# Patient Record
Sex: MALE | Race: WHITE
[De-identification: names, ages, dates, MRNs, and addresses within clinical notes are randomized per-mention and may not be internally consistent; named-entity substitution may affect disease eponyms.]

---

## 2019-11-22 ENCOUNTER — HOSPITAL ENCOUNTER (EMERGENCY)
Dept: HOSPITAL 95 - ER | Age: 49
Discharge: HOME | End: 2019-11-22
Payer: COMMERCIAL

## 2019-11-22 VITALS — WEIGHT: 208.01 LBS | HEIGHT: 70 IN | BODY MASS INDEX: 29.78 KG/M2

## 2019-11-22 DIAGNOSIS — E11.51: Primary | ICD-10-CM

## 2019-11-22 DIAGNOSIS — F43.10: ICD-10-CM

## 2019-11-22 DIAGNOSIS — F17.200: ICD-10-CM

## 2019-11-22 DIAGNOSIS — Z79.899: ICD-10-CM

## 2019-11-22 DIAGNOSIS — I10: ICD-10-CM

## 2019-11-22 DIAGNOSIS — K21.9: ICD-10-CM

## 2019-11-22 LAB
ALBUMIN SERPL BCP-MCNC: 4.1 G/DL (ref 3.4–5)
ALBUMIN/GLOB SERPL: 1.2 {RATIO} (ref 0.8–1.8)
ALT SERPL W P-5'-P-CCNC: 31 U/L (ref 12–78)
ANION GAP SERPL CALCULATED.4IONS-SCNC: 11 MMOL/L (ref 6–16)
AST SERPL W P-5'-P-CCNC: 18 U/L (ref 12–37)
BASOPHILS # BLD AUTO: 0.07 K/MM3 (ref 0–0.23)
BASOPHILS NFR BLD AUTO: 1 % (ref 0–2)
BILIRUB SERPL-MCNC: 0.8 MG/DL (ref 0.1–1)
BUN SERPL-MCNC: 7 MG/DL (ref 8–24)
CALCIUM SERPL-MCNC: 9.2 MG/DL (ref 8.5–10.1)
CHLORIDE SERPL-SCNC: 99 MMOL/L (ref 98–108)
CO2 SERPL-SCNC: 24 MMOL/L (ref 21–32)
CREAT SERPL-MCNC: 0.79 MG/DL (ref 0.6–1.2)
DEPRECATED RDW RBC AUTO: 47.5 FL (ref 35.1–46.3)
EOSINOPHIL # BLD AUTO: 0.23 K/MM3 (ref 0–0.68)
EOSINOPHIL NFR BLD AUTO: 3 % (ref 0–6)
ERYTHROCYTE [DISTWIDTH] IN BLOOD BY AUTOMATED COUNT: 14.5 % (ref 11.7–14.2)
GLOBULIN SER CALC-MCNC: 3.5 G/DL (ref 2.2–4)
GLUCOSE SERPL-MCNC: 215 MG/DL (ref 70–99)
HCT VFR BLD AUTO: 48.9 % (ref 37–53)
HGB BLD-MCNC: 16.2 G/DL (ref 13.5–17.5)
IMM GRANULOCYTES # BLD AUTO: 0.03 K/MM3 (ref 0–0.1)
IMM GRANULOCYTES NFR BLD AUTO: 0 % (ref 0–1)
LYMPHOCYTES # BLD AUTO: 2.81 K/MM3 (ref 0.84–5.2)
LYMPHOCYTES NFR BLD AUTO: 31 % (ref 21–46)
MCHC RBC AUTO-ENTMCNC: 33.1 G/DL (ref 31.5–36.5)
MCV RBC AUTO: 91 FL (ref 80–100)
MONOCYTES # BLD AUTO: 0.49 K/MM3 (ref 0.16–1.47)
MONOCYTES NFR BLD AUTO: 5 % (ref 4–13)
NEUTROPHILS # BLD AUTO: 5.45 K/MM3 (ref 1.96–9.15)
NEUTROPHILS NFR BLD AUTO: 60 % (ref 41–73)
NRBC # BLD AUTO: 0 K/MM3 (ref 0–0.02)
NRBC BLD AUTO-RTO: 0 /100 WBC (ref 0–0.2)
PLATELET # BLD AUTO: 224 K/MM3 (ref 150–400)
POTASSIUM SERPL-SCNC: 3.9 MMOL/L (ref 3.5–5.5)
PROT SERPL-MCNC: 7.6 G/DL (ref 6.4–8.2)
SODIUM SERPL-SCNC: 134 MMOL/L (ref 136–145)
TROPONIN I SERPL-MCNC: <0.015 NG/ML (ref 0–0.04)

## 2019-12-16 ENCOUNTER — HOSPITAL ENCOUNTER (OUTPATIENT)
Dept: HOSPITAL 95 - ER | Age: 49
Setting detail: OBSERVATION
LOS: 2 days | Discharge: HOME | End: 2019-12-18
Attending: HOSPITALIST | Admitting: HOSPITALIST
Payer: COMMERCIAL

## 2019-12-16 VITALS — WEIGHT: 203.71 LBS | BODY MASS INDEX: 29.16 KG/M2 | HEIGHT: 70 IN

## 2019-12-16 DIAGNOSIS — Z79.82: ICD-10-CM

## 2019-12-16 DIAGNOSIS — E11.51: Primary | ICD-10-CM

## 2019-12-16 DIAGNOSIS — Z87.891: ICD-10-CM

## 2019-12-16 DIAGNOSIS — E11.628: ICD-10-CM

## 2019-12-16 DIAGNOSIS — K21.9: ICD-10-CM

## 2019-12-16 DIAGNOSIS — J44.9: ICD-10-CM

## 2019-12-16 DIAGNOSIS — E78.5: ICD-10-CM

## 2019-12-16 DIAGNOSIS — L03.031: ICD-10-CM

## 2019-12-16 DIAGNOSIS — Z79.4: ICD-10-CM

## 2019-12-16 DIAGNOSIS — Z79.899: ICD-10-CM

## 2019-12-16 DIAGNOSIS — E11.65: ICD-10-CM

## 2019-12-16 DIAGNOSIS — F43.10: ICD-10-CM

## 2019-12-16 DIAGNOSIS — I10: ICD-10-CM

## 2019-12-16 DIAGNOSIS — Z79.1: ICD-10-CM

## 2019-12-16 LAB
ALBUMIN SERPL BCP-MCNC: 4.2 G/DL (ref 3.4–5)
ALBUMIN/GLOB SERPL: 1 {RATIO} (ref 0.8–1.8)
ALT SERPL W P-5'-P-CCNC: 26 U/L (ref 12–78)
ANION GAP SERPL CALCULATED.4IONS-SCNC: 13 MMOL/L (ref 6–16)
AST SERPL W P-5'-P-CCNC: 11 U/L (ref 12–37)
BASOPHILS # BLD AUTO: 0.12 K/MM3 (ref 0–0.23)
BASOPHILS NFR BLD AUTO: 1 % (ref 0–2)
BILIRUB SERPL-MCNC: 1.1 MG/DL (ref 0.1–1)
BUN SERPL-MCNC: 10 MG/DL (ref 8–24)
CALCIUM SERPL-MCNC: 9.3 MG/DL (ref 8.5–10.1)
CHLORIDE SERPL-SCNC: 99 MMOL/L (ref 98–108)
CO2 SERPL-SCNC: 19 MMOL/L (ref 21–32)
CREAT SERPL-MCNC: 0.59 MG/DL (ref 0.6–1.2)
DEPRECATED RDW RBC AUTO: 42.4 FL (ref 35.1–46.3)
EOSINOPHIL # BLD AUTO: 0.21 K/MM3 (ref 0–0.68)
EOSINOPHIL NFR BLD AUTO: 2 % (ref 0–6)
ERYTHROCYTE [DISTWIDTH] IN BLOOD BY AUTOMATED COUNT: 13.3 % (ref 11.7–14.2)
GLOBULIN SER CALC-MCNC: 4.1 G/DL (ref 2.2–4)
GLUCOSE SERPL-MCNC: 253 MG/DL (ref 70–99)
HCT VFR BLD AUTO: 51.7 % (ref 37–53)
HGB BLD-MCNC: 17.9 G/DL (ref 13.5–17.5)
IMM GRANULOCYTES # BLD AUTO: 0.02 K/MM3 (ref 0–0.1)
IMM GRANULOCYTES NFR BLD AUTO: 0 % (ref 0–1)
LYMPHOCYTES # BLD AUTO: 3.93 K/MM3 (ref 0.84–5.2)
LYMPHOCYTES NFR BLD AUTO: 39 % (ref 21–46)
MCHC RBC AUTO-ENTMCNC: 34.6 G/DL (ref 31.5–36.5)
MCV RBC AUTO: 86 FL (ref 80–100)
MONOCYTES # BLD AUTO: 0.61 K/MM3 (ref 0.16–1.47)
MONOCYTES NFR BLD AUTO: 6 % (ref 4–13)
NEUTROPHILS # BLD AUTO: 5.25 K/MM3 (ref 1.96–9.15)
NEUTROPHILS NFR BLD AUTO: 52 % (ref 41–73)
NRBC # BLD AUTO: 0 K/MM3 (ref 0–0.02)
NRBC BLD AUTO-RTO: 0 /100 WBC (ref 0–0.2)
PLATELET # BLD AUTO: 282 K/MM3 (ref 150–400)
POTASSIUM SERPL-SCNC: 4.2 MMOL/L (ref 3.5–5.5)
PROT SERPL-MCNC: 8.3 G/DL (ref 6.4–8.2)
PROTHROMBIN TIME: 9.8 SEC (ref 9.7–11.5)
SODIUM SERPL-SCNC: 131 MMOL/L (ref 136–145)

## 2019-12-16 PROCEDURE — C1760 CLOSURE DEV, VASC: HCPCS

## 2019-12-16 PROCEDURE — C1725 CATH, TRANSLUMIN NON-LASER: HCPCS

## 2019-12-16 PROCEDURE — C2623 CATH, TRANSLUMIN, DRUG-COAT: HCPCS

## 2019-12-16 PROCEDURE — C1894 INTRO/SHEATH, NON-LASER: HCPCS

## 2019-12-16 PROCEDURE — G0378 HOSPITAL OBSERVATION PER HR: HCPCS

## 2019-12-16 PROCEDURE — C1887 CATHETER, GUIDING: HCPCS

## 2019-12-16 PROCEDURE — C1769 GUIDE WIRE: HCPCS

## 2019-12-16 PROCEDURE — C1884 EMBOLIZATION PROTECT SYST: HCPCS

## 2019-12-16 PROCEDURE — C1714 CATH, TRANS ATHERECTOMY, DIR: HCPCS

## 2019-12-17 LAB
ANION GAP SERPL CALCULATED.4IONS-SCNC: 12 MMOL/L (ref 6–16)
BASOPHILS # BLD AUTO: 0.09 K/MM3 (ref 0–0.23)
BASOPHILS NFR BLD AUTO: 1 % (ref 0–2)
BUN SERPL-MCNC: 12 MG/DL (ref 8–24)
CALCIUM SERPL-MCNC: 8.7 MG/DL (ref 8.5–10.1)
CHLORIDE SERPL-SCNC: 97 MMOL/L (ref 98–108)
CO2 SERPL-SCNC: 20 MMOL/L (ref 21–32)
CREAT SERPL-MCNC: 0.56 MG/DL (ref 0.6–1.2)
DEPRECATED RDW RBC AUTO: 43.2 FL (ref 35.1–46.3)
EOSINOPHIL # BLD AUTO: 0.36 K/MM3 (ref 0–0.68)
EOSINOPHIL NFR BLD AUTO: 4 % (ref 0–6)
ERYTHROCYTE [DISTWIDTH] IN BLOOD BY AUTOMATED COUNT: 13.4 % (ref 11.7–14.2)
GLUCOSE SERPL-MCNC: 409 MG/DL (ref 70–99)
HCT VFR BLD AUTO: 47.8 % (ref 37–53)
HGB BLD-MCNC: 16.4 G/DL (ref 13.5–17.5)
IMM GRANULOCYTES # BLD AUTO: 0.02 K/MM3 (ref 0–0.1)
IMM GRANULOCYTES NFR BLD AUTO: 0 % (ref 0–1)
LYMPHOCYTES # BLD AUTO: 5.05 K/MM3 (ref 0.84–5.2)
LYMPHOCYTES NFR BLD AUTO: 51 % (ref 21–46)
MCHC RBC AUTO-ENTMCNC: 34.3 G/DL (ref 31.5–36.5)
MCV RBC AUTO: 87 FL (ref 80–100)
MONOCYTES # BLD AUTO: 0.78 K/MM3 (ref 0.16–1.47)
MONOCYTES NFR BLD AUTO: 8 % (ref 4–13)
NEUTROPHILS # BLD AUTO: 3.69 K/MM3 (ref 1.96–9.15)
NEUTROPHILS NFR BLD AUTO: 37 % (ref 41–73)
NRBC # BLD AUTO: 0 K/MM3 (ref 0–0.02)
NRBC BLD AUTO-RTO: 0 /100 WBC (ref 0–0.2)
PLATELET # BLD AUTO: 249 K/MM3 (ref 150–400)
POTASSIUM SERPL-SCNC: 3.9 MMOL/L (ref 3.5–5.5)
PROTHROMBIN TIME: 10.1 SEC (ref 9.7–11.5)
SODIUM SERPL-SCNC: 129 MMOL/L (ref 136–145)

## 2019-12-17 PROCEDURE — 047T3ZZ DILATION OF RIGHT PERONEAL ARTERY, PERCUTANEOUS APPROACH: ICD-10-PCS | Performed by: RADIOLOGY

## 2019-12-17 PROCEDURE — 047L3ZZ DILATION OF LEFT FEMORAL ARTERY, PERCUTANEOUS APPROACH: ICD-10-PCS | Performed by: RADIOLOGY

## 2019-12-17 PROCEDURE — 047R3ZZ DILATION OF RIGHT POSTERIOR TIBIAL ARTERY, PERCUTANEOUS APPROACH: ICD-10-PCS | Performed by: RADIOLOGY

## 2019-12-17 NOTE — NUR
HE HAD AN UNEVENTFUL MORNING WAITING FOR HIS PROCEDURE. HE WAS NPO EXCEPT
MEDS. HIS PAIN WAS RELIEVED WITH 2 MG IV DILAUDID X1. HIS BROTHER VISITED AND
HIS SISTER CALLED. RT 5TH TOE RED. H LIKED IT ELEVATED ON 1 PILLOW.
MAINTAINENCE IVF'S ONGOING AND A HEPARIN DRIP UNTIL [facility] NURSES TURNED
THEM OFF AT 1445 AND TOOK THE PATIENT FOR HIS PROCEDURE. HIS DRIP HAD BEEN
TITRATED UP ONCE FOR SUBTHERAPEUTIC PTT. WILL GIVE REPORT TO MANISH IN PCU AS
HE WILL GO THERE POST PROCEDURE.

## 2019-12-17 NOTE — NUR
PT ARRIVED TO ROOM FROM  AT 1700. BEDSIDE REPORT FROM HC RN. GROIN AND PEDAL
ACCESS X2 SOFT, NO S/S OF BLEEDING, CHG CLEAR DRESSINGS INTACT. PT PLACED FLAT
IN TREND IN ORDER TO WATCH TV. ICE CHIPS GIVEN. ORDERS CLARIFIED WITH DR. BADILLO, PHARMACY NOTIFIED THAT HE STILL WANTS THEM TO MANAGE HEPARIN GTT THRU
THE NIGHT. PHARMACIST VERIFIED DR'S INSTRUCTIONS RE BOLUS. LS CLEAR T/O, HRR,
A&O X4, BTX4, PPP, VSS. ORIENTED TO ROOM. BED ALARM IN PLACE, POSITION LOCKED
AND PT INFORMED OF MOVEMENT RESTRICTIONS UNTIL 2140. WILL CONT TO MONITOR AND
CHART ANY CHANGES THEN REPORT TO NOC RN. CALL LIGHT IN REACH. DINNER TRAY
ORDERED AND PROVIDED WITH ASSISTANCE.

## 2019-12-18 LAB
ANION GAP SERPL CALCULATED.4IONS-SCNC: 7 MMOL/L (ref 6–16)
BASOPHILS # BLD AUTO: 0.08 K/MM3 (ref 0–0.23)
BASOPHILS NFR BLD AUTO: 1 % (ref 0–2)
BUN SERPL-MCNC: 10 MG/DL (ref 8–24)
CALCIUM SERPL-MCNC: 8.8 MG/DL (ref 8.5–10.1)
CHLORIDE SERPL-SCNC: 103 MMOL/L (ref 98–108)
CO2 SERPL-SCNC: 24 MMOL/L (ref 21–32)
CREAT SERPL-MCNC: 0.54 MG/DL (ref 0.6–1.2)
DEPRECATED RDW RBC AUTO: 45 FL (ref 35.1–46.3)
EOSINOPHIL # BLD AUTO: 0.32 K/MM3 (ref 0–0.68)
EOSINOPHIL NFR BLD AUTO: 5 % (ref 0–6)
ERYTHROCYTE [DISTWIDTH] IN BLOOD BY AUTOMATED COUNT: 13.7 % (ref 11.7–14.2)
GLUCOSE SERPL-MCNC: 296 MG/DL (ref 70–99)
HCT VFR BLD AUTO: 43.3 % (ref 37–53)
HGB BLD-MCNC: 14.7 G/DL (ref 13.5–17.5)
IMM GRANULOCYTES # BLD AUTO: 0.01 K/MM3 (ref 0–0.1)
IMM GRANULOCYTES NFR BLD AUTO: 0 % (ref 0–1)
LYMPHOCYTES # BLD AUTO: 2.51 K/MM3 (ref 0.84–5.2)
LYMPHOCYTES NFR BLD AUTO: 36 % (ref 21–46)
MCHC RBC AUTO-ENTMCNC: 33.9 G/DL (ref 31.5–36.5)
MCV RBC AUTO: 89 FL (ref 80–100)
MONOCYTES # BLD AUTO: 0.53 K/MM3 (ref 0.16–1.47)
MONOCYTES NFR BLD AUTO: 8 % (ref 4–13)
NEUTROPHILS # BLD AUTO: 3.46 K/MM3 (ref 1.96–9.15)
NEUTROPHILS NFR BLD AUTO: 50 % (ref 41–73)
NRBC # BLD AUTO: 0 K/MM3 (ref 0–0.02)
NRBC BLD AUTO-RTO: 0 /100 WBC (ref 0–0.2)
PLATELET # BLD AUTO: 206 K/MM3 (ref 150–400)
POTASSIUM SERPL-SCNC: 4 MMOL/L (ref 3.5–5.5)
SODIUM SERPL-SCNC: 134 MMOL/L (ref 136–145)

## 2019-12-18 NOTE — NUR
SHIFT SUMMARY:
50 Y/O MALE RESTED COMFORTABLY ALL SHIFT; PTS LEFT GROIN TEGADERM DRESSING
DRY AND INTACT WITH NO HEMATOMA NOTED AT SURROUNDING SKIN; PTS RIGHT FOOT
ANTERIOR AND LATERAL TEGADERM DRESSING DRY AND INTACT WITH FOOT WARM TO TOUCH;
RIGHT FOOT PUSH & PULL STRONG; ABLE TO PIVOT, TRANSFER ONTO BSC X1 STANDBY
ASSIST WITHOUT DIFFICULTY; C/O LEFT GROIN AND RIGHT FOOT PAIN 6/10 WITH
DILAUDID 1MG IVP GIVEN TWICE THIS SHIFT WITH RELIEF FELT; HEPARIN CONTINUES TO
INFUSE WITHOUT S/S BLEEDING NOTED; TELEMETRY REFLECTS NSR WITH HEART RATE 72
PER STEFANY--TELE TECH; BED LOW POSITION WITH CALL LIGHT AT SIDE.

## 2019-12-18 NOTE — NUR
PT DISCHARGED WITH WRITTEN AND VERBAL INSTRUCITONS. VERBALIZED AND DEMNSTRATED
UNDERSTANDING IN PARTICULAR RELATED TO INSULIN ADMINISTRATION AND SSC. BROTHER
HERE TO  PT. PT ESCORTED OOD VIA WC TO [facility]. SCRIPTS AND VASC
CLOSURE DEVICE CARD PROVIDED ALONG WITH INSTRUCTIONS AND DIABETIC EDUCATION
AND VASC ACCESS CARE INSTRUCTIONS. IV'S DC'D INTACT. 1ST DOSE PLAVIX
ADMINISTERED. VSS.

## 2019-12-18 NOTE — NUR
REPORT REC'D. PT SITTING UP IN BED WATCHING TV. REQUESTS TO USE THE BSC.
ASSISTED BY CNA. ASSESSMENT AS NOTED. REQUESTS PAIN MEDS WHEN HE IS ABLE TO
HAVE THEM FOR PAIN "7/10" BURNING SENSATION TO RT FOOT AND LEFT THIGH. VSS.
CALL LIGHT IN REACH.

## 2020-10-21 ENCOUNTER — HOSPITAL ENCOUNTER (EMERGENCY)
Dept: HOSPITAL 95 - ER | Age: 50
Discharge: HOME | End: 2020-10-21
Payer: COMMERCIAL

## 2020-10-21 VITALS — HEIGHT: 70 IN | BODY MASS INDEX: 31.5 KG/M2 | WEIGHT: 220 LBS

## 2020-10-21 DIAGNOSIS — E78.5: ICD-10-CM

## 2020-10-21 DIAGNOSIS — Z79.899: ICD-10-CM

## 2020-10-21 DIAGNOSIS — Z79.82: ICD-10-CM

## 2020-10-21 DIAGNOSIS — I73.9: Primary | ICD-10-CM

## 2020-11-30 ENCOUNTER — HOSPITAL ENCOUNTER (OUTPATIENT)
Dept: HOSPITAL 95 - OLS | Age: 50
Discharge: HOME | End: 2020-11-30
Payer: COMMERCIAL

## 2020-11-30 DIAGNOSIS — F17.201: Primary | ICD-10-CM

## 2020-11-30 DIAGNOSIS — M79.604: ICD-10-CM

## 2020-11-30 DIAGNOSIS — M79.605: ICD-10-CM

## 2020-11-30 DIAGNOSIS — Z79.899: ICD-10-CM

## 2020-11-30 DIAGNOSIS — I73.9: ICD-10-CM

## 2020-11-30 DIAGNOSIS — E11.40: ICD-10-CM

## 2020-11-30 PROCEDURE — G0480 DRUG TEST DEF 1-7 CLASSES: HCPCS

## 2020-12-04 LAB
COTININE UR-MCNC: <10 NG/ML
NICOTINE UR-MCNC: <10 NG/ML

## 2021-01-05 ENCOUNTER — HOSPITAL ENCOUNTER (OUTPATIENT)
Dept: HOSPITAL 95 - MHTC | Age: 51
Discharge: HOME | End: 2021-01-05
Attending: RADIOLOGY
Payer: COMMERCIAL

## 2021-01-05 VITALS — HEIGHT: 70 IN | WEIGHT: 224.87 LBS | BODY MASS INDEX: 32.19 KG/M2

## 2021-01-05 DIAGNOSIS — F32.9: ICD-10-CM

## 2021-01-05 DIAGNOSIS — I10: ICD-10-CM

## 2021-01-05 DIAGNOSIS — Z20.822: ICD-10-CM

## 2021-01-05 DIAGNOSIS — I70.213: Primary | ICD-10-CM

## 2021-01-05 DIAGNOSIS — F43.10: ICD-10-CM

## 2021-01-05 DIAGNOSIS — Z87.891: ICD-10-CM

## 2021-01-05 DIAGNOSIS — G40.909: ICD-10-CM

## 2021-01-05 DIAGNOSIS — E11.9: ICD-10-CM

## 2021-01-05 DIAGNOSIS — E78.5: ICD-10-CM

## 2021-01-05 LAB
ANION GAP SERPL CALCULATED.4IONS-SCNC: 6 MMOL/L (ref 6–16)
BASOPHILS # BLD AUTO: 0.11 K/MM3 (ref 0–0.23)
BASOPHILS NFR BLD AUTO: 1 % (ref 0–2)
BUN SERPL-MCNC: 19 MG/DL (ref 8–24)
CALCIUM SERPL-MCNC: 9.5 MG/DL (ref 8.5–10.1)
CHLORIDE SERPL-SCNC: 100 MMOL/L (ref 98–108)
CO2 SERPL-SCNC: 27 MMOL/L (ref 21–32)
CREAT SERPL-MCNC: 0.65 MG/DL (ref 0.6–1.2)
DEPRECATED RDW RBC AUTO: 43.8 FL (ref 35.1–46.3)
EOSINOPHIL # BLD AUTO: 0.41 K/MM3 (ref 0–0.68)
EOSINOPHIL NFR BLD AUTO: 4 % (ref 0–6)
ERYTHROCYTE [DISTWIDTH] IN BLOOD BY AUTOMATED COUNT: 13.9 % (ref 11.7–14.2)
GLUCOSE SERPL-MCNC: 395 MG/DL (ref 70–99)
HCT VFR BLD AUTO: 50.2 % (ref 37–53)
HGB BLD-MCNC: 16.8 G/DL (ref 13.5–17.5)
IMM GRANULOCYTES # BLD AUTO: 0.02 K/MM3 (ref 0–0.1)
IMM GRANULOCYTES NFR BLD AUTO: 0 % (ref 0–1)
LYMPHOCYTES # BLD AUTO: 3.27 K/MM3 (ref 0.84–5.2)
LYMPHOCYTES NFR BLD AUTO: 35 % (ref 21–46)
MCHC RBC AUTO-ENTMCNC: 33.5 G/DL (ref 31.5–36.5)
MCV RBC AUTO: 87 FL (ref 80–100)
MONOCYTES # BLD AUTO: 0.68 K/MM3 (ref 0.16–1.47)
MONOCYTES NFR BLD AUTO: 7 % (ref 4–13)
NEUTROPHILS # BLD AUTO: 4.84 K/MM3 (ref 1.96–9.15)
NEUTROPHILS NFR BLD AUTO: 52 % (ref 41–73)
NRBC # BLD AUTO: 0 K/MM3 (ref 0–0.02)
NRBC BLD AUTO-RTO: 0 /100 WBC (ref 0–0.2)
PLATELET # BLD AUTO: 225 K/MM3 (ref 150–400)
POTASSIUM SERPL-SCNC: 4 MMOL/L (ref 3.5–5.5)
PROTHROMBIN TIME: 9.9 SEC (ref 9.7–11.5)
SODIUM SERPL-SCNC: 133 MMOL/L (ref 136–145)

## 2021-01-05 PROCEDURE — C1725 CATH, TRANSLUMIN NON-LASER: HCPCS

## 2021-01-05 PROCEDURE — C1874 STENT, COATED/COV W/DEL SYS: HCPCS

## 2021-01-05 PROCEDURE — C1887 CATHETER, GUIDING: HCPCS

## 2021-01-05 PROCEDURE — C1894 INTRO/SHEATH, NON-LASER: HCPCS

## 2021-01-05 PROCEDURE — C1769 GUIDE WIRE: HCPCS

## 2021-01-05 PROCEDURE — C2623 CATH, TRANSLUMIN, DRUG-COAT: HCPCS

## 2021-01-05 PROCEDURE — C1714 CATH, TRANS ATHERECTOMY, DIR: HCPCS

## 2021-01-05 PROCEDURE — C1884 EMBOLIZATION PROTECT SYST: HCPCS

## 2021-01-05 PROCEDURE — C1760 CLOSURE DEV, VASC: HCPCS

## 2021-02-18 ENCOUNTER — HOSPITAL ENCOUNTER (OUTPATIENT)
Dept: HOSPITAL 95 - MHTC | Age: 51
Discharge: HOME | End: 2021-02-18
Attending: PHYSICIAN ASSISTANT
Payer: COMMERCIAL

## 2021-02-18 VITALS — WEIGHT: 212.97 LBS | HEIGHT: 70 IN | BODY MASS INDEX: 30.49 KG/M2

## 2021-02-18 DIAGNOSIS — M79.672: ICD-10-CM

## 2021-02-18 DIAGNOSIS — I70.213: ICD-10-CM

## 2021-02-18 DIAGNOSIS — F32.9: ICD-10-CM

## 2021-02-18 DIAGNOSIS — E78.5: ICD-10-CM

## 2021-02-18 DIAGNOSIS — I99.8: Primary | ICD-10-CM

## 2021-02-18 DIAGNOSIS — I10: ICD-10-CM

## 2021-02-18 DIAGNOSIS — E11.9: ICD-10-CM

## 2021-02-18 DIAGNOSIS — F43.10: ICD-10-CM

## 2021-02-18 DIAGNOSIS — G40.909: ICD-10-CM

## 2021-02-18 DIAGNOSIS — Z87.891: ICD-10-CM

## 2021-02-18 LAB
ANION GAP SERPL CALCULATED.4IONS-SCNC: 10 MMOL/L (ref 6–16)
BASOPHILS # BLD AUTO: 0.06 K/MM3 (ref 0–0.23)
BASOPHILS NFR BLD AUTO: 1 % (ref 0–2)
BUN SERPL-MCNC: 12 MG/DL (ref 8–24)
CALCIUM SERPL-MCNC: 9.6 MG/DL (ref 8.5–10.1)
CHLORIDE SERPL-SCNC: 104 MMOL/L (ref 98–108)
CO2 SERPL-SCNC: 25 MMOL/L (ref 21–32)
CREAT SERPL-MCNC: 0.57 MG/DL (ref 0.6–1.2)
DEPRECATED RDW RBC AUTO: 41.7 FL (ref 35.1–46.3)
EOSINOPHIL # BLD AUTO: 0.18 K/MM3 (ref 0–0.68)
EOSINOPHIL NFR BLD AUTO: 2 % (ref 0–6)
ERYTHROCYTE [DISTWIDTH] IN BLOOD BY AUTOMATED COUNT: 13.7 % (ref 11.7–14.2)
GLUCOSE SERPL-MCNC: 267 MG/DL (ref 70–99)
HCT VFR BLD AUTO: 48.9 % (ref 37–53)
HGB BLD-MCNC: 16.7 G/DL (ref 13.5–17.5)
IMM GRANULOCYTES # BLD AUTO: 0.02 K/MM3 (ref 0–0.1)
IMM GRANULOCYTES NFR BLD AUTO: 0 % (ref 0–1)
LYMPHOCYTES # BLD AUTO: 2.66 K/MM3 (ref 0.84–5.2)
LYMPHOCYTES NFR BLD AUTO: 30 % (ref 21–46)
MCHC RBC AUTO-ENTMCNC: 34.2 G/DL (ref 31.5–36.5)
MCV RBC AUTO: 84 FL (ref 80–100)
MONOCYTES # BLD AUTO: 0.47 K/MM3 (ref 0.16–1.47)
MONOCYTES NFR BLD AUTO: 5 % (ref 4–13)
NEUTROPHILS # BLD AUTO: 5.39 K/MM3 (ref 1.96–9.15)
NEUTROPHILS NFR BLD AUTO: 61 % (ref 41–73)
NRBC # BLD AUTO: 0 K/MM3 (ref 0–0.02)
NRBC BLD AUTO-RTO: 0 /100 WBC (ref 0–0.2)
PLATELET # BLD AUTO: 254 K/MM3 (ref 150–400)
POTASSIUM SERPL-SCNC: 3.7 MMOL/L (ref 3.5–5.5)
PROTHROMBIN TIME: 9.8 SEC (ref 9.7–11.5)
SODIUM SERPL-SCNC: 139 MMOL/L (ref 136–145)

## 2021-02-18 PROCEDURE — A9270 NON-COVERED ITEM OR SERVICE: HCPCS

## 2021-02-18 PROCEDURE — C1769 GUIDE WIRE: HCPCS

## 2021-02-18 PROCEDURE — C1887 CATHETER, GUIDING: HCPCS

## 2021-02-18 PROCEDURE — C1714 CATH, TRANS ATHERECTOMY, DIR: HCPCS

## 2021-02-18 PROCEDURE — C1874 STENT, COATED/COV W/DEL SYS: HCPCS

## 2021-02-18 PROCEDURE — C1725 CATH, TRANSLUMIN NON-LASER: HCPCS

## 2021-02-18 PROCEDURE — C1894 INTRO/SHEATH, NON-LASER: HCPCS

## 2021-02-18 PROCEDURE — C1760 CLOSURE DEV, VASC: HCPCS

## 2021-02-18 NOTE — NUR
SHIFT SUMMARY
PATIENT IS ALERT AND ORIENTED. INDEPENDENT IN BED. PATIENTS RIGHT FEMORAL AND
LEFT POPLITEAL SITES WNL, PINPOINT OF BLOOD ON EACH, BANDAGES C/D/I. SAT
PATIENT UP SLOWLY OVER 4 HOURS. PATIENT STOOD AT SIDE OF BED. SURGICAL SITES
STILL INTACT, DOPPLER OVER LEFT FOOT SHOWED GOOD PULSES. LEFT FOOT PINK AND
WARM.  PULSES WNL ON ALL EXTREMETIES. VSS, NO ACTUE CHANGES. PATIENT REQUESTED
PAIN MEDS BUT THEN REFUSED BECAUSE HE WAS DEMANDING TO LEAVE RIGHT NOW.
PATIENT ASKED US TO CALL BROTHER TO HAVE HIM PICKED UP. PATIENT WAS IRRITABLE
AND INSISTED HE BE DISCHARGED SOON. PATIENT LEFT UNIT VIA WHEEL CHAIR WITH
PCT @2240 WHEN HIS BROTHER GOT HERE.

## 2021-02-18 NOTE — NUR
Telephone report received from Riddhi Sanchez at this time. Dr. Cabezas to
put in orders right away, as there are presently none save for a telemetry
monitoring order which was placed by PCU RN.
Per report,plan is for pt to be discharged home after recovery period is over,
which is anticipated to be around 2130 today.

## 2021-03-02 ENCOUNTER — HOSPITAL ENCOUNTER (INPATIENT)
Dept: HOSPITAL 95 - ER | Age: 51
LOS: 2 days | Discharge: ANOTHER HOSPITAL | DRG: 862 | End: 2021-03-04
Attending: INTERNAL MEDICINE | Admitting: HOSPITALIST
Payer: COMMERCIAL

## 2021-03-02 VITALS — WEIGHT: 208.78 LBS | HEIGHT: 70.98 IN | BODY MASS INDEX: 29.23 KG/M2

## 2021-03-02 DIAGNOSIS — E11.52: ICD-10-CM

## 2021-03-02 DIAGNOSIS — R65.20: ICD-10-CM

## 2021-03-02 DIAGNOSIS — A40.1: ICD-10-CM

## 2021-03-02 DIAGNOSIS — G92: ICD-10-CM

## 2021-03-02 DIAGNOSIS — Z95.820: ICD-10-CM

## 2021-03-02 DIAGNOSIS — E11.10: ICD-10-CM

## 2021-03-02 DIAGNOSIS — Z79.02: ICD-10-CM

## 2021-03-02 DIAGNOSIS — Z79.4: ICD-10-CM

## 2021-03-02 DIAGNOSIS — E83.39: ICD-10-CM

## 2021-03-02 DIAGNOSIS — T81.44XA: ICD-10-CM

## 2021-03-02 DIAGNOSIS — T81.40XA: Primary | ICD-10-CM

## 2021-03-02 DIAGNOSIS — E78.5: ICD-10-CM

## 2021-03-02 DIAGNOSIS — Z79.82: ICD-10-CM

## 2021-03-02 DIAGNOSIS — Z91.14: ICD-10-CM

## 2021-03-02 DIAGNOSIS — E87.6: ICD-10-CM

## 2021-03-02 DIAGNOSIS — I97.638: ICD-10-CM

## 2021-03-02 DIAGNOSIS — K21.9: ICD-10-CM

## 2021-03-02 DIAGNOSIS — E87.1: ICD-10-CM

## 2021-03-02 DIAGNOSIS — L03.116: ICD-10-CM

## 2021-03-02 DIAGNOSIS — Z20.822: ICD-10-CM

## 2021-03-02 DIAGNOSIS — Z87.891: ICD-10-CM

## 2021-03-02 DIAGNOSIS — E87.8: ICD-10-CM

## 2021-03-02 DIAGNOSIS — I82.432: ICD-10-CM

## 2021-03-02 DIAGNOSIS — Z79.899: ICD-10-CM

## 2021-03-02 DIAGNOSIS — I10: ICD-10-CM

## 2021-03-02 DIAGNOSIS — F43.10: ICD-10-CM

## 2021-03-02 DIAGNOSIS — I96: ICD-10-CM

## 2021-03-02 LAB
ALBUMIN SERPL BCP-MCNC: 2.3 G/DL (ref 3.4–5)
ALBUMIN/GLOB SERPL: 0.4 {RATIO} (ref 0.8–1.8)
ALT SERPL W P-5'-P-CCNC: 19 U/L (ref 12–78)
ANION GAP SERPL CALCULATED.4IONS-SCNC: 13 MMOL/L (ref 6–16)
ANION GAP SERPL CALCULATED.4IONS-SCNC: 16 MMOL/L (ref 6–16)
ANION GAP SERPL CALCULATED.4IONS-SCNC: 24 MMOL/L (ref 6–16)
ANION GAP SERPL CALCULATED.4IONS-SCNC: 25 MMOL/L (ref 6–16)
AST SERPL W P-5'-P-CCNC: 15 U/L (ref 12–37)
BASOPHILS # BLD: 0 K/MM3 (ref 0–0.23)
BASOPHILS NFR BLD: 0 % (ref 0–2)
BILIRUB SERPL-MCNC: 0.9 MG/DL (ref 0.1–1)
BUN SERPL-MCNC: 11 MG/DL (ref 8–24)
BUN SERPL-MCNC: 12 MG/DL (ref 8–24)
BUN SERPL-MCNC: 12 MG/DL (ref 8–24)
BUN SERPL-MCNC: 9 MG/DL (ref 8–24)
CALCIUM SERPL-MCNC: 7.5 MG/DL (ref 8.5–10.1)
CALCIUM SERPL-MCNC: 8.1 MG/DL (ref 8.5–10.1)
CALCIUM SERPL-MCNC: 8.1 MG/DL (ref 8.5–10.1)
CALCIUM SERPL-MCNC: 9.1 MG/DL (ref 8.5–10.1)
CANNABINOIDS UR QL: DETECTED
CHLORIDE SERPL-SCNC: 100 MMOL/L (ref 98–108)
CHLORIDE SERPL-SCNC: 101 MMOL/L (ref 98–108)
CHLORIDE SERPL-SCNC: 86 MMOL/L (ref 98–108)
CHLORIDE SERPL-SCNC: 95 MMOL/L (ref 98–108)
CO2 SERPL-SCNC: 13 MMOL/L (ref 21–32)
CO2 SERPL-SCNC: 7 MMOL/L (ref 21–32)
CO2 SERPL-SCNC: 8 MMOL/L (ref 21–32)
CO2 SERPL-SCNC: 9 MMOL/L (ref 21–32)
CREAT SERPL-MCNC: 0.4 MG/DL (ref 0.6–1.2)
CREAT SERPL-MCNC: 0.46 MG/DL (ref 0.6–1.2)
CREAT SERPL-MCNC: 0.55 MG/DL (ref 0.6–1.2)
CREAT SERPL-MCNC: 0.58 MG/DL (ref 0.6–1.2)
DEPRECATED RDW RBC AUTO: 48.2 FL (ref 35.1–46.3)
EOSINOPHIL # BLD: 0 K/MM3 (ref 0–0.68)
EOSINOPHIL NFR BLD: 0 % (ref 0–6)
ERYTHROCYTE [DISTWIDTH] IN BLOOD BY AUTOMATED COUNT: 15.3 % (ref 11.7–14.2)
FLUAV RNA SPEC QL NAA+PROBE: NEGATIVE
FLUBV RNA SPEC QL NAA+PROBE: NEGATIVE
GLOBULIN SER CALC-MCNC: 6 G/DL (ref 2.2–4)
GLUCOSE SERPL-MCNC: 239 MG/DL (ref 70–99)
GLUCOSE SERPL-MCNC: 277 MG/DL (ref 70–99)
GLUCOSE SERPL-MCNC: 329 MG/DL (ref 70–99)
GLUCOSE SERPL-MCNC: 403 MG/DL (ref 70–99)
GLUCOSE UR-MCNC: (no result) MG/DL
HCT VFR BLD AUTO: 31.1 % (ref 37–53)
HCT VFR BLD AUTO: 35.9 % (ref 37–53)
HCT VFR BLD AUTO: 41.7 % (ref 37–53)
HGB BLD-MCNC: 10.9 G/DL (ref 13.5–17.5)
HGB BLD-MCNC: 12 G/DL (ref 13.5–17.5)
HGB BLD-MCNC: 14.1 G/DL (ref 13.5–17.5)
KETONES UR STRIP-MCNC: (no result) MG/DL
LYMPHOCYTES # BLD: 1.64 K/MM3 (ref 0.84–5.2)
LYMPHOCYTES NFR BLD: 6 % (ref 21–46)
MCHC RBC AUTO-ENTMCNC: 33.8 G/DL (ref 31.5–36.5)
MCV RBC AUTO: 85 FL (ref 80–100)
MONOCYTES # BLD: 1.36 K/MM3 (ref 0.16–1.47)
MONOCYTES NFR BLD: 5 % (ref 4–13)
MYELOCYTES # BLD MANUAL: 0.54 K/MM3 (ref 0–0)
MYELOCYTES NFR BLD MANUAL: 2 % (ref 0–0)
NEUTS BAND NFR BLD MANUAL: 11 % (ref 0–8)
NEUTS SEG # BLD MANUAL: 23.8 K/MM3 (ref 1.96–9.15)
NEUTS SEG NFR BLD MANUAL: 76 % (ref 41–73)
NRBC # BLD AUTO: 0 K/MM3 (ref 0–0.02)
NRBC BLD AUTO-RTO: 0 /100 WBC (ref 0–0.2)
OPIATES UR-MCNC: DETECTED NG/ML
PH BLDV: 7.07 [PH] (ref 7.34–7.37)
PLATELET # BLD AUTO: 468 K/MM3 (ref 150–400)
POTASSIUM SERPL-SCNC: 3.2 MMOL/L (ref 3.5–5.5)
POTASSIUM SERPL-SCNC: 3.3 MMOL/L (ref 3.5–5.5)
POTASSIUM SERPL-SCNC: 3.3 MMOL/L (ref 3.5–5.5)
POTASSIUM SERPL-SCNC: 3.4 MMOL/L (ref 3.5–5.5)
PROT SERPL-MCNC: 8.3 G/DL (ref 6.4–8.2)
PROT UR STRIP-MCNC: (no result) MG/DL
PROTHROMBIN TIME: 11.4 SEC (ref 9.7–11.5)
RBC #/AREA URNS HPF: (no result) /HPF (ref 0–2)
RSV RNA SPEC QL NAA+PROBE: NEGATIVE
SARS-COV-2 RNA RESP QL NAA+PROBE: NEGATIVE
SODIUM SERPL-SCNC: 119 MMOL/L (ref 136–145)
SODIUM SERPL-SCNC: 125 MMOL/L (ref 136–145)
SODIUM SERPL-SCNC: 126 MMOL/L (ref 136–145)
SODIUM SERPL-SCNC: 127 MMOL/L (ref 136–145)
SP GR SPEC: 1.02 (ref 1–1.02)
TOTAL CELLS COUNTED BLD: 100
UROBILINOGEN UR STRIP-MCNC: (no result) MG/DL
WBC #/AREA URNS HPF: (no result) /HPF (ref 0–5)

## 2021-03-02 PROCEDURE — C1751 CATH, INF, PER/CENT/MIDLINE: HCPCS

## 2021-03-02 PROCEDURE — A9270 NON-COVERED ITEM OR SERVICE: HCPCS

## 2021-03-02 NOTE — NUR
ADMISSION:
REPORT RECEIVED FROM BUTCH MARIA RN. PT ARRIVED TO ICU14 AT APPROX 1320. ON
ARRIVAL, HE IS A&O, COOPERATIVE W/ CARE. LS ARE CLEAR T/O, PT ON RA W/ O2 SATS
> 92%. MONITOR SHOWS SR W/ HR 100s, BP STABLE. HTN W/ INCREASED C/O PAIN. PT
HAS NO GI COMPLAINTS OTHER THAN HUNGER & THIRST, SIPS & ICE CHIPS PROVIDED,
LAST BM IN ED, PER REPORT. PT VOIDS BY STANDING/ AMBULATING TO BSC. SKIN
OVERALL DRY/ SCALING. R FEMORAL ACCESS SITE FOR PRIOR ANGIOGRAMS x2, ONE W/
SMALL AMNT PURULENT DRAINAGE, OTHER SITE APPROX 2CM IN LENGTH W/ BLACK LIQUID
DRAINAGE, FIBROUS/ STRINGY MATERIAL AT THE SITE TUNNELING INTO WOUND. VENOUS
ULCER NOTED TO TOP OF LEFT FOOT. ALL TOES ON LEFT FOOT W/ BLACK TIPS & LEFT
5TH TOE IS COMPLETELY BLACK.

## 2021-03-02 NOTE — NUR
PROVIDER UPDATE:
CALL TO MAYDA LYNCH NP, REGARDING PT's POOR PAIN CONTROL W/ MORPHINE IVP.
ORDERS FOR FENTANYL IVP & PERCOCET PLACED, MORPHINE D/C'd. PT's CBG HAS
NOW BELOW 250 & PROVIDER NOTIFIED OF THIS ALSO. ORDERS PLACED FOR D5 1/2NS @
150 ML/HR & CONTINUE INSULIN DRIP.

## 2021-03-02 NOTE — NUR
SHIFT SUMMARY:
NO ACUTE CHANGES SINCE PRIOR UPDATES. PT REMAINS A&O, COOPERATIVE W/ CARE. LS
CLEAR T/O, PT ON RA W/ O2 SATS > 92%. MONITOR SHOWS ST W/ HR 100s, HTN
IMPROVED. PT CONTINUES TO REQUEST WATER & ICE CHIPS FREQUENTLY & NEEDS
REMINDERS THAT PO INTAKE IS LIMITED AT THIS TIME. NO BM SINCE ADMISSION. PT
VOIDS W/O DIFFICULTY USING BSC. SKIN CONDITION OVERALL UNCHANGED. NABEEL AVINA
TO ASSUME CARE, HAS RECEIVED REPORT & WILL BE TAKING PHOTOS FOR CHART
DOCUMENTATION OF PT's WOUNDS.

## 2021-03-02 NOTE — NUR
DR KENYON:
PROVIDER AT BEDSIDE TO EVAL PT. HE STS THE LEFT 5TH TOE WILL NEED TO BE
AMPUTATED BUT THAT THE SURGERY WILL NOT OCCUR UNTIL THE PT IS OFF OF THE
INSULIN DRIP & BLOOD SUGAR HAS STABILIZED FULLY.

## 2021-03-03 LAB
ALBUMIN SERPL BCP-MCNC: 1.5 G/DL (ref 3.4–5)
ALBUMIN/GLOB SERPL: 0.3 {RATIO} (ref 0.8–1.8)
ALT SERPL W P-5'-P-CCNC: 14 U/L (ref 12–78)
ANION GAP SERPL CALCULATED.4IONS-SCNC: 10 MMOL/L (ref 6–16)
ANION GAP SERPL CALCULATED.4IONS-SCNC: 10 MMOL/L (ref 6–16)
ANION GAP SERPL CALCULATED.4IONS-SCNC: 8 MMOL/L (ref 6–16)
ANION GAP SERPL CALCULATED.4IONS-SCNC: 9 MMOL/L (ref 6–16)
AST SERPL W P-5'-P-CCNC: 17 U/L (ref 12–37)
BASOPHILS # BLD AUTO: 0.09 K/MM3 (ref 0–0.23)
BASOPHILS # BLD: 0 K/MM3 (ref 0–0.23)
BASOPHILS # BLD: 0 K/MM3 (ref 0–0.23)
BASOPHILS NFR BLD AUTO: 0 % (ref 0–2)
BASOPHILS NFR BLD: 0 % (ref 0–2)
BASOPHILS NFR BLD: 0 % (ref 0–2)
BILIRUB SERPL-MCNC: 0.5 MG/DL (ref 0.1–1)
BUN SERPL-MCNC: 5 MG/DL (ref 8–24)
BUN SERPL-MCNC: 5 MG/DL (ref 8–24)
BUN SERPL-MCNC: 6 MG/DL (ref 8–24)
BUN SERPL-MCNC: 8 MG/DL (ref 8–24)
CALCIUM SERPL-MCNC: 7.8 MG/DL (ref 8.5–10.1)
CALCIUM SERPL-MCNC: 7.9 MG/DL (ref 8.5–10.1)
CALCIUM SERPL-MCNC: 7.9 MG/DL (ref 8.5–10.1)
CALCIUM SERPL-MCNC: 8.4 MG/DL (ref 8.5–10.1)
CHLORIDE SERPL-SCNC: 101 MMOL/L (ref 98–108)
CHLORIDE SERPL-SCNC: 103 MMOL/L (ref 98–108)
CHLORIDE SERPL-SCNC: 103 MMOL/L (ref 98–108)
CHLORIDE SERPL-SCNC: 104 MMOL/L (ref 98–108)
CO2 SERPL-SCNC: 16 MMOL/L (ref 21–32)
CO2 SERPL-SCNC: 18 MMOL/L (ref 21–32)
CO2 SERPL-SCNC: 20 MMOL/L (ref 21–32)
CO2 SERPL-SCNC: 22 MMOL/L (ref 21–32)
CREAT SERPL-MCNC: 0.48 MG/DL (ref 0.6–1.2)
CREAT SERPL-MCNC: 0.51 MG/DL (ref 0.6–1.2)
CREAT SERPL-MCNC: 0.52 MG/DL (ref 0.6–1.2)
CREAT SERPL-MCNC: 0.55 MG/DL (ref 0.6–1.2)
DEPRECATED RDW RBC AUTO: 45 FL (ref 35.1–46.3)
DEPRECATED RDW RBC AUTO: 46.1 FL (ref 35.1–46.3)
EOSINOPHIL # BLD AUTO: 0.03 K/MM3 (ref 0–0.68)
EOSINOPHIL # BLD: 0 K/MM3 (ref 0–0.68)
EOSINOPHIL # BLD: 0 K/MM3 (ref 0–0.68)
EOSINOPHIL NFR BLD AUTO: 0 % (ref 0–6)
EOSINOPHIL NFR BLD: 0 % (ref 0–6)
EOSINOPHIL NFR BLD: 0 % (ref 0–6)
ERYTHROCYTE [DISTWIDTH] IN BLOOD BY AUTOMATED COUNT: 15.2 % (ref 11.7–14.2)
ERYTHROCYTE [DISTWIDTH] IN BLOOD BY AUTOMATED COUNT: 15.3 % (ref 11.7–14.2)
GLOBULIN SER CALC-MCNC: 4.4 G/DL (ref 2.2–4)
GLUCOSE SERPL-MCNC: 171 MG/DL (ref 70–99)
GLUCOSE SERPL-MCNC: 173 MG/DL (ref 70–99)
GLUCOSE SERPL-MCNC: 180 MG/DL (ref 70–99)
GLUCOSE SERPL-MCNC: 208 MG/DL (ref 70–99)
HCT VFR BLD AUTO: 29.4 % (ref 37–53)
HCT VFR BLD AUTO: 31 % (ref 37–53)
HGB BLD-MCNC: 10.4 G/DL (ref 13.5–17.5)
HGB BLD-MCNC: 10.8 G/DL (ref 13.5–17.5)
IMM GRANULOCYTES # BLD AUTO: 1.02 K/MM3 (ref 0–0.1)
IMM GRANULOCYTES NFR BLD AUTO: 4 % (ref 0–1)
LYMPHOCYTES # BLD AUTO: 1.21 K/MM3 (ref 0.84–5.2)
LYMPHOCYTES # BLD: 1.08 K/MM3 (ref 0.84–5.2)
LYMPHOCYTES # BLD: 1.62 K/MM3 (ref 0.84–5.2)
LYMPHOCYTES NFR BLD AUTO: 5 % (ref 21–46)
LYMPHOCYTES NFR BLD: 5 % (ref 21–46)
LYMPHOCYTES NFR BLD: 7 % (ref 21–46)
MAGNESIUM SERPL-MCNC: 1.6 MG/DL (ref 1.6–2.4)
MCHC RBC AUTO-ENTMCNC: 34.8 G/DL (ref 31.5–36.5)
MCHC RBC AUTO-ENTMCNC: 35.4 G/DL (ref 31.5–36.5)
MCV RBC AUTO: 81 FL (ref 80–100)
MCV RBC AUTO: 81 FL (ref 80–100)
METAMYELOCYTES # BLD MANUAL: 0.21 K/MM3 (ref 0–0)
METAMYELOCYTES # BLD MANUAL: 0.23 K/MM3 (ref 0–0)
METAMYELOCYTES NFR BLD MANUAL: 1 % (ref 0–0)
METAMYELOCYTES NFR BLD MANUAL: 1 % (ref 0–0)
MONOCYTES # BLD AUTO: 1.22 K/MM3 (ref 0.16–1.47)
MONOCYTES # BLD: 0.69 K/MM3 (ref 0.16–1.47)
MONOCYTES # BLD: 1.73 K/MM3 (ref 0.16–1.47)
MONOCYTES NFR BLD AUTO: 5 % (ref 4–13)
MONOCYTES NFR BLD: 3 % (ref 4–13)
MONOCYTES NFR BLD: 8 % (ref 4–13)
NEUTROPHILS # BLD AUTO: 19.58 K/MM3 (ref 1.96–9.15)
NEUTROPHILS NFR BLD AUTO: 85 % (ref 41–73)
NEUTS BAND NFR BLD MANUAL: 10 % (ref 0–8)
NEUTS BAND NFR BLD MANUAL: 9 % (ref 0–8)
NEUTS SEG # BLD MANUAL: 18.65 K/MM3 (ref 1.96–9.15)
NEUTS SEG # BLD MANUAL: 20.6 K/MM3 (ref 1.96–9.15)
NEUTS SEG NFR BLD MANUAL: 77 % (ref 41–73)
NEUTS SEG NFR BLD MANUAL: 79 % (ref 41–73)
NRBC # BLD AUTO: 0 K/MM3 (ref 0–0.02)
NRBC # BLD AUTO: 0 K/MM3 (ref 0–0.02)
NRBC BLD AUTO-RTO: 0 /100 WBC (ref 0–0.2)
NRBC BLD AUTO-RTO: 0 /100 WBC (ref 0–0.2)
PHOSPHATE SERPL-MCNC: 0.6 MG/DL (ref 2.5–4.9)
PHOSPHATE SERPL-MCNC: 1 MG/DL (ref 2.5–4.9)
PHOSPHATE SERPL-MCNC: 1.2 MG/DL (ref 2.5–4.9)
PLATELET # BLD AUTO: 371 K/MM3 (ref 150–400)
PLATELET # BLD AUTO: 392 K/MM3 (ref 150–400)
POTASSIUM SERPL-SCNC: 2.8 MMOL/L (ref 3.5–5.5)
POTASSIUM SERPL-SCNC: 2.8 MMOL/L (ref 3.5–5.5)
POTASSIUM SERPL-SCNC: 2.9 MMOL/L (ref 3.5–5.5)
POTASSIUM SERPL-SCNC: 3.3 MMOL/L (ref 3.5–5.5)
PROT SERPL-MCNC: 5.9 G/DL (ref 6.4–8.2)
SODIUM SERPL-SCNC: 130 MMOL/L (ref 136–145)
SODIUM SERPL-SCNC: 131 MMOL/L (ref 136–145)
SODIUM SERPL-SCNC: 131 MMOL/L (ref 136–145)
SODIUM SERPL-SCNC: 132 MMOL/L (ref 136–145)
TOTAL CELLS COUNTED BLD: 100
TOTAL CELLS COUNTED BLD: 100
VANCOMYCIN TROUGH SERPL-MCNC: 13.4 UG/ML (ref 5–10)

## 2021-03-03 NOTE — NUR
Shift Summary:
 
Patient drowsy throughout shift following prn doses of Percocet or fentanyl
for c/o pain, but remains oriented x4, calm and cooperative with staff. Prn
pain medications (see MAR) effective to manage pain. Insulin gtt remains on
throughout shift, titrated between 5-9 u/hr. Several calls placed to Dr. Ibanez throughout shift to adress lab values and plan of care. Received
orders to replace K+ this morning, then additional orders to replace K+ and
phosphorus this evening (see MAR). Also received instructions to keep insulin
trip on as bicarb remains slightly low, repeat BMP ordered for 2200. Also
received order for repeat US of rt groin, results reviewed by Dr. Ibanez,
then received order for CT w/o to rt groin, NOC shift RN to take patient to CT
early this shift. Patient tolerating PO food and fluids without difficulty.
Report given to NOC shift RN Mukesh.

## 2021-03-03 NOTE — NUR
[place] of Care:
 
Care assumed at 0700hr. Patient sleeping, but easily roused to verbal stimuli.
Wakes to alert and oriented x4. C/o mild pain to lt foot, not requiring any
prn medications at this time. 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
overnight per NOC RN. Dr. Ho consulted r/t gangrene toe, plan to amputate
toe after patient's DKA is resolved. Rt groin site (arterial), from 2/18 with
Dr. Zhao, appears to be infected (see pic in chart), patient receiving IV
Abx. Plan to discuss rt groin site and plan of care with Dr. Ibanez this
morning. Patient also on insulin gtt at 7u/hr, and heparin gtt at 19u/kg/hr
per EMAR. Will continue to monitor blood sugar q1hr and titrate as indicated.
Power-glide to rt upper arm patent and intact, infusing without difficulty.
Peripheral IV's x2 patent and intact. Will continue to monitor.

## 2021-03-03 NOTE — NUR
SHIFT SUMMARY
 
PATIENT HAS SLEPT WELL TONIGHT. PAIN WELL CONTROLLED WITH AVAILABLE PRN
MEDICATIONS. BLOOD SUGARS IMPROVING, FINALLY FOUND RIGHT AMMOUNT OF INSULIN TO
KEEP CBG AROUND 180, ANION GAP IMPROVING, CO2 STILL ACIDOTIC. NO C/O DYSPNEA.
ASSESSMENT IS AS CHARTED. VSS. WILL CONTINUE TO MONITOR.

## 2021-03-04 LAB
ALBUMIN SERPL BCP-MCNC: 1.3 G/DL (ref 3.4–5)
ALBUMIN/GLOB SERPL: 0.3 {RATIO} (ref 0.8–1.8)
ALT SERPL W P-5'-P-CCNC: 22 U/L (ref 12–78)
ANION GAP SERPL CALCULATED.4IONS-SCNC: 7 MMOL/L (ref 6–16)
ANION GAP SERPL CALCULATED.4IONS-SCNC: 9 MMOL/L (ref 6–16)
AST SERPL W P-5'-P-CCNC: 33 U/L (ref 12–37)
BASOPHILS # BLD: 0 K/MM3 (ref 0–0.23)
BASOPHILS NFR BLD: 0 % (ref 0–2)
BILIRUB SERPL-MCNC: 0.9 MG/DL (ref 0.1–1)
BUN SERPL-MCNC: 5 MG/DL (ref 8–24)
BUN SERPL-MCNC: 5 MG/DL (ref 8–24)
CALCIUM SERPL-MCNC: 7.5 MG/DL (ref 8.5–10.1)
CALCIUM SERPL-MCNC: 7.7 MG/DL (ref 8.5–10.1)
CHLORIDE SERPL-SCNC: 101 MMOL/L (ref 98–108)
CHLORIDE SERPL-SCNC: 102 MMOL/L (ref 98–108)
CO2 SERPL-SCNC: 20 MMOL/L (ref 21–32)
CO2 SERPL-SCNC: 23 MMOL/L (ref 21–32)
CREAT SERPL-MCNC: 0.46 MG/DL (ref 0.6–1.2)
CREAT SERPL-MCNC: 0.47 MG/DL (ref 0.6–1.2)
DEPRECATED RDW RBC AUTO: 43.8 FL (ref 35.1–46.3)
EOSINOPHIL # BLD: 0 K/MM3 (ref 0–0.68)
EOSINOPHIL NFR BLD: 0 % (ref 0–6)
ERYTHROCYTE [DISTWIDTH] IN BLOOD BY AUTOMATED COUNT: 15.1 % (ref 11.7–14.2)
GLOBULIN SER CALC-MCNC: 4.2 G/DL (ref 2.2–4)
GLUCOSE SERPL-MCNC: 207 MG/DL (ref 70–99)
GLUCOSE SERPL-MCNC: 228 MG/DL (ref 70–99)
HCT VFR BLD AUTO: 27.7 % (ref 37–53)
HGB BLD-MCNC: 9.9 G/DL (ref 13.5–17.5)
LYMPHOCYTES # BLD: 1.09 K/MM3 (ref 0.84–5.2)
LYMPHOCYTES NFR BLD: 6 % (ref 21–46)
MCHC RBC AUTO-ENTMCNC: 35.7 G/DL (ref 31.5–36.5)
MCV RBC AUTO: 79 FL (ref 80–100)
MONOCYTES # BLD: 1.45 K/MM3 (ref 0.16–1.47)
MONOCYTES NFR BLD: 8 % (ref 4–13)
NEUTS BAND NFR BLD MANUAL: 26 % (ref 0–8)
NEUTS SEG # BLD MANUAL: 15.62 K/MM3 (ref 1.96–9.15)
NEUTS SEG NFR BLD MANUAL: 60 % (ref 41–73)
NRBC # BLD AUTO: 0 K/MM3 (ref 0–0.02)
NRBC BLD AUTO-RTO: 0 /100 WBC (ref 0–0.2)
PLATELET # BLD AUTO: 342 K/MM3 (ref 150–400)
POTASSIUM SERPL-SCNC: 2.7 MMOL/L (ref 3.5–5.5)
POTASSIUM SERPL-SCNC: 3.1 MMOL/L (ref 3.5–5.5)
PROT SERPL-MCNC: 5.5 G/DL (ref 6.4–8.2)
SODIUM SERPL-SCNC: 130 MMOL/L (ref 136–145)
SODIUM SERPL-SCNC: 132 MMOL/L (ref 136–145)
TOTAL CELLS COUNTED BLD: 100

## 2021-03-04 NOTE — NUR
SHIFT SUMMARY
 
PATIENT HAS NOT SLEPT WELL TONIGHT. SUGGESTED PATIENT TAKE TRAZADONE HE HAS
DECLINED SO FAR. PER DR RAVI DISCONTINUED INSULIN DRIP, GAP CLOSED, CO2
BACK WITHIN NORMAL LIMITS PER BMP. GAVE LANTUS, TURNED DRIP OFF 2 HOURS LATER,
NOW CHECKING BLOOD SUGARS Q6H. PATIENT CURRENTLY RECEIVING POTASSIUM PHORPHAGE
INFUSION, DID NOT CALL MOST RECENT RESULTS, WILL GET RECHECK FOR LATER IN AM.
PT. HAS BEEN NPO SINCE MIDNIGHT. PAIN GETTING WORSE, HOWEVER MANAGABLE WITH
PRN FENTANYL. GROIN SITE NOT LOOKING WORSE. LEFT FOOT PULSES REMAIN
DOPPLEABLE. ASSESSMENT IS AS CHARTED. VSS. WILL CONTINUE TO MONITOR.

## 2023-02-01 NOTE — NUR
[place] of Care/Transfer to [facility]:
 
Care assumed at 0700hr. Patient alert and oriented x4, sitting upright in bed
watching tv. VSS, spO2 96-98% on RA, denies dyspnea or SOB. C/o pain to lt
knee and foot, effectively managed with prn fentanyl and Percocet throughout
the morning. Call light in reach, makes needs known. Peripheral IV's x2 and
power-glide to BETH patent and intact. Using urinal in bed without difficulty.
 
Dr. Ibanez to room at approx 0800hr. Addressed lab values (K+, Phos) with
Dr. Ibanez, received orders for Kphos and KCL IV replacement, also received
instructions to re-start insulin gtt as patient's bicarb had decreased and
blood sugars had increased. Insulin gtt re-started at 2u/hr at approx 0815hr.
Also discussed plan for patient, Dr. Ibanez stated plan for Dr. Ho to
take patient to OR at 1600hr for toe amputation. Dr. Ibanez also consulted
with Dr. Emerson (general surgeon) r/t rt groin abscess. Dr. Emerson then to room
to see patient at approx 0845hr. Dr. Emerson assessed rt groin and then stated
plan to consult with vascular surgeon at [place], r/t abscess likely being
infected hematoma surrounding groin artery and vein. Dr. Emerson then returned
to room and stated plan to transfer patient to [place] [facility] for vascular surgeon
consult. Transfer order then recieved by Dr. Ibanez, and bed assignment
received at [place] [facility] (rm [id number]). Report called to Rachel LEES and [place] and
transport ([facility]) called for transport. Current gtt's
including insulin, D5%-1/2NS, Kphos, and KCL reviewed with paramedics. VS
remained stable. Patient transferred to [facility] without difficulty. Belongings
sent with patient. Left unit at 1100hr. details…